# Patient Record
Sex: FEMALE | Race: AMERICAN INDIAN OR ALASKA NATIVE
[De-identification: names, ages, dates, MRNs, and addresses within clinical notes are randomized per-mention and may not be internally consistent; named-entity substitution may affect disease eponyms.]

---

## 2020-07-18 NOTE — EMERGENCY DEPARTMENT REPORT
Chief Complaint: Burn/Smoke Inhalation


Stated Complaint: BURN RIGHT SIDE OF FACE


Time Seen by Provider: 07/18/20 04:15





- HPI


History of Present Illness: 





24-year-old -American female presents to the emergency room complaining 

of burn on her right side of her face and chest.  Patient states that she was 

tending to a grill when the hamburger grease had popped in her face and on her 

chest while she was grilling 20 minutes prior to arrival.  Patient states that 

she did place ice on her wound.  Patient states that the pain is minimum.





- Exam


Vital Signs: 


                                   Vital Signs











  07/18/20





  00:11


 


Temperature 98.2 F


 


Pulse Rate 78


 


Respiratory 20





Rate 


 


Blood Pressure 126/77


 


O2 Sat by Pulse 99





Oximetry 











Physical Exam: 





Patient is alert and oriented x3 no acute distress nontoxic in appearance


Face multiple hyper pigmented spots on face no blistering appreciated 

nonerythematous no swelling.


MSE screening note: 


Focused history and physical exam performed.


Due to findings the following was ordered:





24-year-old -American female presents to the emergency room complaining 

of burn on her right side of her face and chest.  Patient states that she was 

tending to a grill when the hamburger grease had popped in her face and on her 

chest while she was grilling 20 minutes prior to arrival.  Patient states that 

she did place ice on her wound.  Patient states that the pain is minimum.








Recommend pain control.  Keep your face clean and dry do not put any oil-based 

creams on your face.  Follow-up with your primary care provider.





ED Disposition for MSE


Disposition: Z-07 MED SCREENING EXAM-LEFT


Is pt being admited?: No


Does the pt Need Aspirin: No


Condition: Stable


Instructions:  Superficial Burn (ED)


Additional Instructions: 


Keep face clean and dry avoid Oil base creams to the face and chest as they are 

exposed to sunlight and will cause further burning.  Follow-up with a primary 

care provider if any further concerns.  Tylenol or ibuprofen as needed for pain 

management


Referrals: 


PRIMARY CARE,MD [Primary Care Provider] - 3-5 Days


[facility] [Provider Group] - 3-5 Days


Forms:  Work/School Release Form(ED)

## 2020-10-12 ENCOUNTER — HOSPITAL ENCOUNTER (EMERGENCY)
Dept: HOSPITAL 5 - ED | Age: 25
Discharge: HOME | End: 2020-10-12
Payer: SELF-PAY

## 2020-10-12 VITALS — DIASTOLIC BLOOD PRESSURE: 68 MMHG | SYSTOLIC BLOOD PRESSURE: 110 MMHG

## 2020-10-12 DIAGNOSIS — K08.89: ICD-10-CM

## 2020-10-12 DIAGNOSIS — K02.9: Primary | ICD-10-CM

## 2020-10-12 DIAGNOSIS — Z79.899: ICD-10-CM

## 2020-10-12 PROCEDURE — 99282 EMERGENCY DEPT VISIT SF MDM: CPT

## 2020-10-12 NOTE — EMERGENCY DEPARTMENT REPORT
ED ENT HPI





- General


Chief complaint: Dental/Oral


Stated complaint: TOOTH ACHE/EAR PAIN


Time Seen by Provider: 10/12/20 18:58


Source: patient


Mode of arrival: Ambulatory


Limitations: No Limitations





- History of Present Illness


MD complaint: tooth pain


Location: tooth #


Severity: mild, moderate


Quality: aching, dull


Consistency: constant


Improves with: none


Worsens with: eating


Context- Dental: history of dental caries


Associated Symptoms: toothache.  denies: sore throat, tinnitus, rhinorrhea





- Related Data


                                  Previous Rx's











 Medication  Instructions  Recorded  Last Taken  Type


 


Amoxicillin [Amoxicillin TAB] 875 mg PO BID #20 tablet 10/12/20 Unknown Rx


 


Chlorhexidine Mouthwash [Peridex] 15 ml MM BID #1 bottle 10/12/20 Unknown Rx


 


Lidocaine Viscous 2% 5 ml MM Q3H PRN #120 udc 10/12/20 Unknown Rx











                                    Allergies











Allergy/AdvReac Type Severity Reaction Status Date / Time


 


No Known Allergies Allergy   Unverified 07/18/20 00:22














ED Dental HPI





- General


Chief complaint: Dental/Oral


Stated complaint: TOOTH ACHE/EAR PAIN


Time Seen by Provider: 10/12/20 18:58


Source: patient


Mode of arrival: Ambulatory


Limitations: No Limitations





- Related Data


                                  Previous Rx's











 Medication  Instructions  Recorded  Last Taken  Type


 


Amoxicillin [Amoxicillin TAB] 875 mg PO BID #20 tablet 10/12/20 Unknown Rx


 


Chlorhexidine Mouthwash [Peridex] 15 ml MM BID #1 bottle 10/12/20 Unknown Rx


 


Lidocaine Viscous 2% 5 ml MM Q3H PRN #120 udc 10/12/20 Unknown Rx











                                    Allergies











Allergy/AdvReac Type Severity Reaction Status Date / Time


 


No Known Allergies Allergy   Unverified 07/18/20 00:22














ED Review of Systems


ROS: 


Stated complaint: TOOTH ACHE/EAR PAIN


Other details as noted in HPI





Comment: All other systems reviewed and negative





ED Past Medical Hx





- Past Medical History


Previous Medical History?: No





- Social History


Smoking Status: Never Smoker


Substance Use Type: None





- Medications


Home Medications: 


                                Home Medications











 Medication  Instructions  Recorded  Confirmed  Last Taken  Type


 


Amoxicillin [Amoxicillin TAB] 875 mg PO BID #20 tablet 10/12/20  Unknown Rx


 


Chlorhexidine Mouthwash [Peridex] 15 ml MM BID #1 bottle 10/12/20  Unknown Rx


 


Lidocaine Viscous 2% 5 ml MM Q3H PRN #120 udc 10/12/20  Unknown Rx














ED Physical Exam





- General


Limitations: No Limitations


General appearance: alert, in no apparent distress





- Head


Head exam: Present: atraumatic, normocephalic





- Eye


Eye exam: Present: normal appearance, PERRL, EOMI


Pupils: Present: normal accommodation





- ENT


ENT exam: Present: normal orophraynx, mucous membranes moist, TM's normal 

bilaterally, other (Tenderness to tooth #31 with some adjacent erythema no 

abscess noted tongue and uvula are midline airways patent.)





- Neck


Neck exam: Present: normal inspection, lymphadenopathy





- Respiratory


Respiratory exam: Present: normal lung sounds bilaterally.  Absent: respiratory 

distress





- Cardiovascular


Cardiovascular Exam: Present: regular rate, normal rhythm.  Absent: systolic 

murmur, diastolic murmur, rubs, gallop





- GI/Abdominal


GI/Abdominal exam: Present: soft, normal bowel sounds





- Extremities Exam


Extremities exam: Present: normal inspection





- Back Exam


Back exam: Present: normal inspection





- Neurological Exam


Neurological exam: Present: alert, oriented X3





- Psychiatric


Psychiatric exam: Present: normal affect, normal mood





- Skin


Skin exam: Present: warm, dry, intact, normal color.  Absent: rash





ED Course


                                   Vital Signs











  10/12/20





  14:57


 


Temperature 98.8 F


 


Pulse Rate 80


 


Respiratory 20





Rate 


 


Blood Pressure 119/54





[Right] 


 


O2 Sat by Pulse 98





Oximetry 











Critical care attestation.: 


If time is entered above; I have spent that time in minutes in the direct care 

of this critically ill patient, excluding procedure time.








ED Disposition


Clinical Impression: 


 Dentalgia, Infected dental caries





Disposition: DC-01 TO HOME OR SELFCARE


Is pt being admited?: No


Does the pt Need Aspirin: No


Condition: Stable


Instructions:  Toothache (ED), Dental Caries (ED)


Prescriptions: 


Amoxicillin [Amoxicillin TAB] 875 mg PO BID #20 tablet


Lidocaine Viscous 2% 5 ml MM Q3H PRN #120 udc


 PRN Reason: Pain, Moderate (4-6)


Chlorhexidine Mouthwash [Peridex] 15 ml MM BID #1 bottle


Referrals: 


PRIMARY CARE,MD [Primary Care Provider] - 3-5 Days


[facility] [Provider Group] - 3-5 Days

## 2021-08-21 ENCOUNTER — HOSPITAL ENCOUNTER (EMERGENCY)
Dept: HOSPITAL 5 - ED | Age: 26
LOS: 1 days | Discharge: HOME | End: 2021-08-22
Payer: MEDICARE

## 2021-08-21 DIAGNOSIS — Z53.21: ICD-10-CM

## 2021-08-21 DIAGNOSIS — R07.9: Primary | ICD-10-CM

## 2021-08-21 PROCEDURE — 93005 ELECTROCARDIOGRAM TRACING: CPT

## 2021-08-21 PROCEDURE — 99283 EMERGENCY DEPT VISIT LOW MDM: CPT

## 2021-08-21 PROCEDURE — 71046 X-RAY EXAM CHEST 2 VIEWS: CPT

## 2021-08-22 VITALS — SYSTOLIC BLOOD PRESSURE: 125 MMHG | DIASTOLIC BLOOD PRESSURE: 71 MMHG

## 2021-08-22 NOTE — XRAY REPORT
CHEST 2 VIEWS 



INDICATION / CLINICAL INFORMATION:

chestpain.





FINDINGS:



SUPPORT DEVICES: None.



HEART / MEDIASTINUM: No significant abnormality. 



LUNGS / PLEURA: No significant pulmonary or pleural abnormality. No pneumothorax. 



ADDITIONAL FINDINGS: No significant additional findings.



IMPRESSION:

1. No acute findings.



Signer Name: Cem Case MD 

Signed: 8/22/2021 3:02 AM

Workstation Name: [id number]

## 2021-08-25 NOTE — ELECTROCARDIOGRAPH REPORT
[facility]

                                       

Test Date:    2021               Test Time:    22:50:03

Pat Name:     THANIA YAO          Department:   

Patient ID:   [id number]          Room:          

Gender:       F                        Technician:   NURSE

:          1995               Requested By: JENI ABBOTT

Order Number: [id number]              Reading MD:   Natasha Garza

                                 Measurements

Intervals                              Axis          

Rate:         72                       P:            70

MD:           170                      QRS:          66

QRSD:         60                       T:            44

QT:           358                                    

QTc:          392                                    

                           Interpretive Statements

Sinus rhythm

No previous ECG available for comparison

Electronically Signed On 2021 13:22:11 EDT by Natasha Garza

## 2021-10-12 ENCOUNTER — HOSPITAL ENCOUNTER (EMERGENCY)
Dept: HOSPITAL 5 - ED | Age: 26
Discharge: HOME | End: 2021-10-12
Payer: COMMERCIAL

## 2021-10-12 VITALS — SYSTOLIC BLOOD PRESSURE: 111 MMHG | DIASTOLIC BLOOD PRESSURE: 70 MMHG

## 2021-10-12 DIAGNOSIS — R11.2: Primary | ICD-10-CM

## 2021-10-12 DIAGNOSIS — R19.7: ICD-10-CM

## 2021-10-12 LAB
ALBUMIN SERPL-MCNC: 4.9 G/DL (ref 3.9–5)
ALT SERPL-CCNC: 17 UNITS/L (ref 7–56)
BILIRUB UR QL STRIP: (no result)
BLOOD UR QL VISUAL: (no result)
BUN SERPL-MCNC: 10 MG/DL (ref 7–17)
BUN/CREAT SERPL: 17 %
CALCIUM SERPL-MCNC: 9.7 MG/DL (ref 8.4–10.2)
HCT VFR BLD CALC: 48 % (ref 30.3–42.9)
HEMOLYSIS INDEX: 57
HGB BLD-MCNC: 16.7 GM/DL (ref 10.1–14.3)
MCHC RBC AUTO-ENTMCNC: 35 % (ref 30–34)
MCV RBC AUTO: 101 FL (ref 79–97)
MUCOUS THREADS #/AREA URNS HPF: (no result) /HPF
PH UR STRIP: 5 [PH] (ref 5–7)
PLATELET # BLD: 190 K/MM3 (ref 140–440)
RBC # BLD AUTO: 4.75 M/MM3 (ref 3.65–5.03)
RBC #/AREA URNS HPF: 2 /HPF (ref 0–6)
UROBILINOGEN UR-MCNC: 4 MG/DL (ref ?–2)
WBC #/AREA URNS HPF: 3 /HPF (ref 0–6)

## 2021-10-12 PROCEDURE — 96374 THER/PROPH/DIAG INJ IV PUSH: CPT

## 2021-10-12 PROCEDURE — 99283 EMERGENCY DEPT VISIT LOW MDM: CPT

## 2021-10-12 PROCEDURE — 36415 COLL VENOUS BLD VENIPUNCTURE: CPT

## 2021-10-12 PROCEDURE — 81001 URINALYSIS AUTO W/SCOPE: CPT

## 2021-10-12 PROCEDURE — 85025 COMPLETE CBC W/AUTO DIFF WBC: CPT

## 2021-10-12 PROCEDURE — 84703 CHORIONIC GONADOTROPIN ASSAY: CPT

## 2021-10-12 PROCEDURE — 80053 COMPREHEN METABOLIC PANEL: CPT

## 2021-10-12 PROCEDURE — 96361 HYDRATE IV INFUSION ADD-ON: CPT

## 2021-10-12 PROCEDURE — 83690 ASSAY OF LIPASE: CPT

## 2021-10-12 NOTE — EMERGENCY DEPARTMENT REPORT
ED General Adult HPI





- General


Chief complaint: Nausea/Vomiting/Diarrhea


Stated complaint: VOMITING STOMACH VIRUS


Time Seen by Provider: 10/12/21 20:14


Source: patient


Mode of arrival: Ambulatory


Limitations: No Limitations





- History of Present Illness


Initial comments: 





25-year-old -American female patient presents with complaints of nausea, 

vomiting, and diarrhea x2 days.  Patient states her symptoms began [place] 

[place] the airport.  She denies any hematemesis/coffee-ground emesis, 

melena/hematochezia, fever/chills/sweats, chest pain, cough, or loss of taste or

smell.  No history of abdominal surgeries per patient.  She admits to mild 

abdominal cramping and rates it as a 2/10 in severity.  She has not tried any 

OTC medications for symptoms.





- Related Data


                                  Previous Rx's











 Medication  Instructions  Recorded  Last Taken  Type


 


Amoxicillin [Amoxicillin TAB] 875 mg PO BID #20 tablet 10/12/20 Unknown Rx


 


Chlorhexidine Mouthwash [Peridex] 15 ml MM BID #1 bottle 10/12/20 Unknown Rx


 


Lidocaine Viscous 2% 5 ml MM Q3H PRN #120 udc 10/12/20 Unknown Rx


 


Ketorolac [Toradol] 10 mg PO Q6H PRN #14 tablet 08/22/21 Unknown Rx


 


Loperamide [Imodium] 2 mg PO Q2HR PRN #8 capsule 10/12/21 Unknown Rx


 


Ondansetron [Zofran Odt] 4 mg PO Q8HR PRN #12 tab.rapdis 10/12/21 Unknown Rx











                                    Allergies











Allergy/AdvReac Type Severity Reaction Status Date / Time


 


No Known Allergies Allergy   Verified 10/12/21 20:09














ED Review of Systems


ROS: 


Stated complaint: VOMITING STOMACH VIRUS


Other details as noted in HPI





Constitutional: denies: chills, diaphoresis, fever, malaise, weakness


Respiratory: denies: cough, shortness of breath


Cardiovascular: denies: chest pain


Gastrointestinal: abdominal pain, nausea, vomiting, diarrhea.  denies: 

constipation, hematemesis, melena, hematochezia


Genitourinary: denies: urgency, dysuria, frequency, hematuria


Musculoskeletal: denies: back pain


Neurological: denies: headache


Hematological/Lymphatic: denies: easy bleeding, swollen glands





ED Past Medical Hx





- Social History


Smoking Status: Never Smoker


Substance Use Type: None





- Medications


Home Medications: 


                                Home Medications











 Medication  Instructions  Recorded  Confirmed  Last Taken  Type


 


Amoxicillin [Amoxicillin TAB] 875 mg PO BID #20 tablet 10/12/20  Unknown Rx


 


Chlorhexidine Mouthwash [Peridex] 15 ml MM BID #1 bottle 10/12/20  Unknown Rx


 


Lidocaine Viscous 2% 5 ml MM Q3H PRN #120 udc 10/12/20  Unknown Rx


 


Ketorolac [Toradol] 10 mg PO Q6H PRN #14 tablet 08/22/21  Unknown Rx


 


Loperamide [Imodium] 2 mg PO Q2HR PRN #8 capsule 10/12/21  Unknown Rx


 


Ondansetron [Zofran Odt] 4 mg PO Q8HR PRN #12 tab.rapdis 10/12/21  Unknown Rx














ED Physical Exam





- General


Limitations: No Limitations


General appearance: alert, in no apparent distress





- Head


Head exam: Present: atraumatic, normocephalic





- Eye


Eye exam: Present: normal appearance.  Absent: scleral icterus





- Respiratory


Respiratory exam: Present: normal lung sounds bilaterally.  Absent: respiratory 

distress





- Cardiovascular


Cardiovascular Exam: Present: regular rate, normal rhythm





- GI/Abdominal


GI/Abdominal exam: Present: soft, tenderness (Minimal periumbilical without 

guarding or rebound), normal bowel sounds.  Absent: distended, rigid





- Expanded GI/Abdominal Exam


  ** Expanded


GI/Abdominal exam: Absent: Pereira's sign, tenderness at [place]'s Point





- Neurological Exam


Neurological exam: Present: alert, oriented X3, normal gait





- Psychiatric


Psychiatric exam: Present: normal affect, normal mood





- Skin


Skin exam: Present: warm, dry, intact, normal color.  Absent: rash





ED Course


                                   Vital Signs











  10/12/21 10/12/21 10/12/21





  20:04 21:23 21:25


 


Temperature 98.8 F 97.6 F 97.6 F


 


Pulse Rate 98 H 65 65


 


Respiratory 18 12 12





Rate   


 


Blood Pressure 122/70  111/70


 


Blood Pressure  122/70 





[Right]   


 


O2 Sat by Pulse 97 99 99





Oximetry   














ED Medical Decision Making





- Lab Data


Result diagrams: 


                                 10/12/21 21:05





                                 10/12/21 21:05








                                   Lab Results











  10/12/21 10/12/21 10/12/21 Range/Units





  21:05 21:05 21:05 


 


WBC  7.4    (4.5-11.0)  K/mm3


 


RBC  4.75    (3.65-5.03)  M/mm3


 


Hgb  16.7 H    (10.1-14.3)  gm/dl


 


Hct  48.0 H    (30.3-42.9)  %


 


MCV  101 H    (79-97)  fl


 


MCH  35 H    (28-32)  pg


 


MCHC  35 H    (30-34)  %


 


RDW  13.1 L    (13.2-15.2)  %


 


Plt Count  190    (140-440)  K/mm3


 


Lymph % (Auto)  Np    


 


Mono % (Auto)  Np    


 


Eos % (Auto)  Np    


 


Baso % (Auto)  Np    


 


Lymph # (Auto)  Np    


 


Mono # (Auto)  Np    


 


Eos # (Auto)  Np    


 


Baso # (Auto)  Np    


 


Seg Neutrophils %  Np    


 


Seg Neutrophils #  Np    


 


Sodium   140   (137-145)  mmol/L


 


Potassium   3.9   (3.6-5.0)  mmol/L


 


Chloride   104.4   ()  mmol/L


 


Carbon Dioxide   23   (22-30)  mmol/L


 


Anion Gap   17   mmol/L


 


BUN   10   (7-17)  mg/dL


 


Creatinine   0.6   (0.6-1.2)  mg/dL


 


Estimated GFR   > 60   ml/min


 


BUN/Creatinine Ratio   17   %


 


Glucose   68   ()  mg/dL


 


Calcium   9.7   (8.4-10.2)  mg/dL


 


Total Bilirubin   0.50   (0.1-1.2)  mg/dL


 


AST   22   (5-40)  units/L


 


ALT   17   (7-56)  units/L


 


Alkaline Phosphatase   82   ()  units/L


 


Total Protein   8.3 H   (6.3-8.2)  g/dL


 


Albumin   4.9   (3.9-5)  g/dL


 


Albumin/Globulin Ratio   1.4   %


 


Lipase   37   (13-60)  units/L


 


HCG, Qual    Negative  (Negative)  














- Medical Decision Making








25-year-old -American female patient presents with complaints of nausea, 

vomiting, and diarrhea x2 days.  Patient states her symptoms began after eating 

food at the airport.  She denies any hematemesis/coffee-ground emesis, norma

na/hematochezia, fever/chills/sweats, chest pain, cough, or loss of taste or 

smell.  No history of abdominal surgeries per patient.  She admits to mild 

abdominal cramping and rates it as a 2/10 in severity.  She has not tried any 

OTC medications for symptoms.





No abdominal tenderness to palpation noted on exam.  Mild elevation in 

hemoglobin noted, labs are otherwise without significant abnormalities.  Patient

 given Zofran and 1 L normal saline.  She is not tolerating fluids p.o.  Her 

vitals are normal and she is well-appearing.  Will treat for viral 

gastroenteritis.  Recommend patient follows up with her primary care doctor in 3

 to 5 days.  Discussed importance of hydration, care plan, and signs and 

symptoms that should prompt immediate return to the ED patient who verbalized 

understanding.  She is to have repeat labs with her primary care provider.


Critical care attestation.: 


If time is entered above; I have spent that time in minutes in the direct care 

of this critically ill patient, excluding procedure time.








ED Disposition


Clinical Impression: 


 Nausea vomiting and diarrhea





Disposition: 01 HOME / SELF CARE / HOMELESS


Is pt being admited?: No


Condition: Stable


Instructions:  Viral Gastroenteritis, Adult


Additional Instructions: 


No significant abnormalities are noted on your labs today.  Please hydrate with 

with water and Pedialyte.  If you develop new or worsening [facility]  Follow-up with your primary care doctor in 3 to 5 

days.


Prescriptions: 


Loperamide [Imodium] 2 mg PO Q2HR PRN #8 capsule


 PRN Reason: Diarrhea


Ondansetron [Zofran Odt] 4 mg PO Q8HR PRN #12 tab.rapdis


 PRN Reason: Nausea


Referrals: 


PRIMARY CARE,MD [Primary Care Provider] - 3-5 Days


Forms:  Work/School Release Form(ED)